# Patient Record
Sex: MALE | Race: WHITE | NOT HISPANIC OR LATINO | Employment: OTHER | URBAN - METROPOLITAN AREA
[De-identification: names, ages, dates, MRNs, and addresses within clinical notes are randomized per-mention and may not be internally consistent; named-entity substitution may affect disease eponyms.]

---

## 2017-10-26 ENCOUNTER — OFFICE VISIT (OUTPATIENT)
Dept: OPTOMETRY | Facility: CLINIC | Age: 82
End: 2017-10-26
Payer: MEDICARE

## 2017-10-26 DIAGNOSIS — H31.012 MACULAR SCAR OF LEFT EYE: ICD-10-CM

## 2017-10-26 DIAGNOSIS — H43.393 VITREOUS FLOATERS OF BOTH EYES: ICD-10-CM

## 2017-10-26 DIAGNOSIS — H52.201 ASTIGMATISM OF RIGHT EYE, UNSPECIFIED TYPE: ICD-10-CM

## 2017-10-26 DIAGNOSIS — H43.393 VITREOUS SYNERESIS OF BOTH EYES: ICD-10-CM

## 2017-10-26 DIAGNOSIS — H25.13 SENILE NUCLEAR SCLEROSIS, BILATERAL: Primary | ICD-10-CM

## 2017-10-26 DIAGNOSIS — H54.8 LEGAL BLINDNESS: ICD-10-CM

## 2017-10-26 DIAGNOSIS — H52.4 PRESBYOPIA: ICD-10-CM

## 2017-10-26 DIAGNOSIS — H35.9 RETINA DISORDER, BILATERAL: ICD-10-CM

## 2017-10-26 PROCEDURE — 92015 DETERMINE REFRACTIVE STATE: CPT | Mod: S$GLB,,, | Performed by: OPTOMETRIST

## 2017-10-26 PROCEDURE — 99999 PR PBB SHADOW E&M-EST. PATIENT-LVL III: CPT | Mod: PBBFAC,,, | Performed by: OPTOMETRIST

## 2017-10-26 PROCEDURE — 92014 COMPRE OPH EXAM EST PT 1/>: CPT | Mod: S$GLB,,, | Performed by: OPTOMETRIST

## 2017-10-26 NOTE — PROGRESS NOTES
"HPI     Concerns About Ocular Health    Additional comments: Eye exam- general eye examination and refraction.  No acute ocular/vision problems.             Comments   Patient's age: 84 y.o. WM  Occupation: Retired   Approximate date of last eye examination:  10/21/2016  Name of last eye doctor seen: Dr. Cohen    Wears glasses? Yes     If yes, wears  Full-time or part-time?  Part time   Present glasses are: Bifocal, SV Distance, SV Reading?  Bifocal  Approximate age of present glasses: 2 years   Got new glasses following last exam, or subsequently?:  No    Any problem with VA with glasses?  No   Wears CLs?:  No   Headaches? Sinus Headaches   Eye pain/discomfort?  No                                                                                      Flashes?  No   Floaters?  No   Diplopia/Double vision?  No   Patient's Ocular History:         Any eye surgeries? No          Any eye injury?  No          Any treatment for eye disease?  No   Family history of eye disease?  No   Significant patient medical history:         1. Diabetes?  No        If yes, IDDM or NIDDM? n/a   2. HBP?  No                 ! OTC eyedrops currently using:  No    ! Prescription eye meds currently using:  No    ! Any history of allergy/adverse reaction to any eye meds used   previously?  No     ! Any history of allergy/adverse reaction to eyedrops used during prior   eye exam(s)? no    ! Any history of allergy/adverse reaction to Novacaine or similar meds?   No    ! Any history of allergy/adverse reaction to Epinephrine or similar meds?   No     ! Patient okay with use of anesthetic eyedrops to check eye pressure?    Yes        ! Patient okay with use of eyedrops to dilate pupils today?  Yes   !  Allergies/Medications/Medical History/Family History reviewed today?    Yes      PD =   65/61  Desired reading distance =  18"                                                                  Last edited by Dustin Cohen, OD on 10/26/2017  " 1:32 PM. (History)            Assessment /Plan     For exam results, see Encounter Report.    1. Senile nuclear sclerosis, bilateral     2. Macular scar of left eye     3. Retina disorder, bilateral     4. Legal blindness - Left Eye     5. Vitreous syneresis of both eyes     6. Vitreous floaters of both eyes     7. Astigmatism of right eye, unspecified type     8. 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   Long-standing history of poor vision in the left eye secondarily.  Bilateral nuclear sclerotic/peripheral cortical cataract, and small axial posterior subcapsular cataract in the left eye. No compelling need for cataract surgery in either eye, as correctable VA in the right eye is very satisfactory, and poor potential for improvement in VA in the the left eye.  Vitreous syneresis with vitreous floaters bilaterally.  Hyperopia with astigmatism in the right eye. Presbyopia.  New spectacle lens Rx issued for use as desired. Recommend full-time wear.   Recheck in twelve months, or prior if any problems or ANY apparent changes in vision in the right eye in the interim.

## 2017-10-26 NOTE — PATIENT INSTRUCTIONS
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   Long-standing history of poor vision in the left eye secondarily.  Bilateral nuclear sclerotic/peripheral cortical cataract, and small axial posterior subcapsular cataract in the left eye. No compelling need for cataract surgery in either eye, as correctable VA in the right eye is very satisfactory, and poor potential for improvement in VA in the the left eye.  Vitreous syneresis with vitreous floaters bilaterally.  Hyperopia with astigmatism in the right eye. Presbyopia.  New spectacle lens Rx issued for use as desired. Recommend full-time wear.   Recheck in twelve months, or prior if any problems or ANY apparent changes in vision in the right eye in the interim.

## 2018-11-07 ENCOUNTER — OFFICE VISIT (OUTPATIENT)
Dept: OPTOMETRY | Facility: CLINIC | Age: 83
End: 2018-11-07
Payer: MEDICARE

## 2018-11-07 DIAGNOSIS — H54.8 LEGAL BLINDNESS: ICD-10-CM

## 2018-11-07 DIAGNOSIS — H52.4 PRESBYOPIA: ICD-10-CM

## 2018-11-07 DIAGNOSIS — H35.9 RETINA DISORDER, BILATERAL: ICD-10-CM

## 2018-11-07 DIAGNOSIS — H31.012 MACULAR SCAR OF LEFT EYE: ICD-10-CM

## 2018-11-07 DIAGNOSIS — H43.393 VITREOUS FLOATERS OF BOTH EYES: ICD-10-CM

## 2018-11-07 DIAGNOSIS — H52.201 ASTIGMATISM OF RIGHT EYE, UNSPECIFIED TYPE: ICD-10-CM

## 2018-11-07 DIAGNOSIS — H25.13 SENILE NUCLEAR SCLEROSIS, BILATERAL: Primary | ICD-10-CM

## 2018-11-07 PROCEDURE — 99999 PR PBB SHADOW E&M-EST. PATIENT-LVL III: CPT | Mod: PBBFAC,,, | Performed by: OPTOMETRIST

## 2018-11-07 PROCEDURE — 92015 DETERMINE REFRACTIVE STATE: CPT | Mod: S$GLB,,, | Performed by: OPTOMETRIST

## 2018-11-07 PROCEDURE — 92014 COMPRE OPH EXAM EST PT 1/>: CPT | Mod: S$GLB,,, | Performed by: OPTOMETRIST

## 2018-11-07 RX ORDER — SOTALOL HYDROCHLORIDE 80 MG/1
TABLET ORAL
COMMUNITY
Start: 2018-10-31

## 2018-11-07 RX ORDER — APIXABAN 5 MG/1
TABLET, FILM COATED ORAL
COMMUNITY
Start: 2018-09-06

## 2018-11-07 RX ORDER — CETIRIZINE HYDROCHLORIDE 10 MG/1
10 TABLET ORAL
COMMUNITY
Start: 2018-08-21

## 2018-11-07 NOTE — PATIENT INSTRUCTIONS
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   Long-standing history of poor vision in the left eye secondarily.    Bilateral nuclear sclerotic/peripheral cortical cataract, and small axial posterior subcapsular cataract in the left eye. No compelling need for cataract surgery in either eye, as correctable VA in the right eye is still very satisfactory, and poor potential for improvement in VA in the the left eye.    Vitreous syneresis with vitreous floaters bilaterally.    Hyperopia with astigmatism in the right eye. Presbyopia.  New spectacle lens Rx issued for use as desired. Recommend full-time wear.     Recheck in twelve months, or prior if any problems or ANY apparent changes in vision in the right eye in the interim.

## 2018-11-07 NOTE — PROGRESS NOTES
"HPI     Concerns About Ocular Health      Additional comments: Annual eye examination and refraction.  History of poor vision in the left eye 2/2 macular scarring.  No acute ocular/vision problems.  No apparent VA changes since last visit.               Comments     Patient's age: 85 y.o. WM  Occupation: Retired   Approximate date of last eye examination:  10/26/2017   Name of last eye doctor seen: Dr. Cohen  Wears glasses? Yes    If yes, wears  Full-time or part-time? full time   Present glasses are: Bifocal, SV Distance, SV Reading?  Bifocal  Approximate age of present glasses: 2 years   Got new glasses following last exam, or subsequently?:  No    Any problem with VA with glasses?  No   Wears CLs?:  No   Headaches? Sinus Headaches   Eye pain/discomfort?  No                                                                                      Flashes?  No   Floaters?  No   Diplopia/Double vision?  No   Patient's Ocular History:         Any eye surgeries? No          Any eye injury?  No          Any treatment for eye disease?  No   Family history of eye disease?  No   Significant patient medical history:         1. Diabetes?  No        If yes, IDDM or NIDDM? n/a   2. HBP?  No                 ! OTC eyedrops currently using:  No    ! Prescription eye meds currently using:  No    ! Any history of allergy/adverse reaction to any eye meds used   previously?  No     ! Any history of allergy/adverse reaction to eyedrops used during prior   eye exam(s)? no    ! Any history of allergy/adverse reaction to Novacaine or similar meds?   No    ! Any history of allergy/adverse reaction to Epinephrine or similar meds?   No     ! Patient okay with use of anesthetic eyedrops to check eye pressure?    Yes        ! Patient okay with use of eyedrops to dilate pupils today?  Yes   !  Allergies/Medications/Medical History/Family History reviewed today?    Yes      PD =   65/61  Desired reading distance =  18"                           "                                           Last edited by Dustin Cohen, OD on 11/7/2018  1:32 PM. (History)            Assessment /Plan     For exam results, see Encounter Report.    1. Senile nuclear sclerosis, bilateral     2. Macular scar of left eye     3. Retina disorder, bilateral     4. Legal blindness - Left Eye     5. Vitreous floaters of both eyes     6. Astigmatism of right eye, unspecified type     7. 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   Long-standing history of poor vision in the left eye secondarily.    Bilateral nuclear sclerotic/peripheral cortical cataract, and small axial posterior subcapsular cataract in the left eye. No compelling need for cataract surgery in either eye, as correctable VA in the right eye is still very satisfactory, and poor potential for improvement in VA in the the left eye.    Vitreous syneresis with vitreous floaters bilaterally.    Hyperopia with astigmatism in the right eye. Presbyopia.  New spectacle lens Rx issued for use as desired. Recommend full-time wear.     Recheck in twelve months, or prior if any problems or ANY apparent changes in vision in the right eye in the interim.

## 2019-11-12 ENCOUNTER — OFFICE VISIT (OUTPATIENT)
Dept: OPTOMETRY | Facility: CLINIC | Age: 84
End: 2019-11-12
Payer: MEDICARE

## 2019-11-12 DIAGNOSIS — H25.13 SENILE NUCLEAR SCLEROSIS, BILATERAL: Primary | ICD-10-CM

## 2019-11-12 DIAGNOSIS — H43.393 VITREOUS FLOATERS OF BOTH EYES: ICD-10-CM

## 2019-11-12 DIAGNOSIS — H35.9 RETINA DISORDER, BILATERAL: ICD-10-CM

## 2019-11-12 DIAGNOSIS — H52.201 ASTIGMATISM OF RIGHT EYE, UNSPECIFIED TYPE: ICD-10-CM

## 2019-11-12 DIAGNOSIS — H52.4 PRESBYOPIA: ICD-10-CM

## 2019-11-12 DIAGNOSIS — H54.8 LEGAL BLINDNESS: ICD-10-CM

## 2019-11-12 DIAGNOSIS — H31.012 MACULAR SCAR OF LEFT EYE: ICD-10-CM

## 2019-11-12 DIAGNOSIS — H26.9 CORTICAL CATARACT OF BOTH EYES: ICD-10-CM

## 2019-11-12 PROCEDURE — 92015 PR REFRACTION: ICD-10-PCS | Mod: S$GLB,,, | Performed by: OPTOMETRIST

## 2019-11-12 PROCEDURE — 99999 PR PBB SHADOW E&M-EST. PATIENT-LVL III: ICD-10-PCS | Mod: PBBFAC,,, | Performed by: OPTOMETRIST

## 2019-11-12 PROCEDURE — 92014 PR EYE EXAM, EST PATIENT,COMPREHESV: ICD-10-PCS | Mod: S$GLB,,, | Performed by: OPTOMETRIST

## 2019-11-12 PROCEDURE — 99999 PR PBB SHADOW E&M-EST. PATIENT-LVL III: CPT | Mod: PBBFAC,,, | Performed by: OPTOMETRIST

## 2019-11-12 PROCEDURE — 92015 DETERMINE REFRACTIVE STATE: CPT | Mod: S$GLB,,, | Performed by: OPTOMETRIST

## 2019-11-12 PROCEDURE — 92014 COMPRE OPH EXAM EST PT 1/>: CPT | Mod: S$GLB,,, | Performed by: OPTOMETRIST

## 2019-11-12 RX ORDER — ALBUTEROL SULFATE 90 UG/1
2 AEROSOL, METERED RESPIRATORY (INHALATION)
COMMUNITY
Start: 2019-05-10

## 2019-11-12 RX ORDER — MECLIZINE HYDROCHLORIDE 25 MG/1
25 TABLET ORAL
COMMUNITY
Start: 2018-12-07

## 2019-11-12 NOTE — PROGRESS NOTES
"HPI     eye examination       Additional comments: No changes with vision              Comments     Patient's age: 86 y.o. WM  Occupation: Retired   Approximate date of last eye examination:  11/07/2018  Name of last eye doctor seen: Dr. Cohen  Wears glasses? Yes    If yes, wears  Full-time or part-time? full time   Present glasses are: Bifocal, SV Distance, SV Reading?  Bifocal lens   Approximate age of present glasses: 2 + years   Got new glasses following last exam, or subsequently?:  No    Any problem with VA with glasses?  No reports no changes with vision.  Wears CLs?:  No   Headaches? No   Eye pain/discomfort?  No                                                                                      Flashes?  No   Floaters?  No   Diplopia/Double vision?  No   Patient's Ocular History:         Any eye surgeries? No          Any eye injury?  No          Any treatment for eye disease?  No   Family history of eye disease?  No   Significant patient medical history:         1. Diabetes?  No        If yes, IDDM or NIDDM? n/a   2. HBP?  No              ! OTC eyedrops currently using:  No    ! Prescription eye meds currently using:  No    ! Any history of allergy/adverse reaction to any eye meds used   previously?  No     ! Any history of allergy/adverse reaction to eyedrops used during prior   eye exam(s)? no    ! Any history of allergy/adverse reaction to Novacaine or similar meds?   No    ! Any history of allergy/adverse reaction to Epinephrine or similar meds?   No     ! Patient okay with use of anesthetic eyedrops to check eye pressure?    Yes        ! Patient okay with use of eyedrops to dilate pupils today?  Yes   !  Allergies/Medications/Medical History/Family History reviewed today?    Yes      PD =   65/61  Desired reading distance =  18"                                                                     Last edited by Dustin Cohen, OD on 11/12/2019 11:31 AM. (History)            Assessment /Plan "     For exam results, see Encounter Report.    1. Senile nuclear sclerosis, bilateral     2. Cortical cataract of both eyes     3. Macular scar of left eye     4. Retina disorder, bilateral     5. Legal blindness - Left Eye     6. Vitreous floaters of both eyes     7. Astigmatism of right eye, unspecified type     8. 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   Long-standing history of poor vision in the left eye secondarily.     Bilateral nuclear sclerotic/peripheral cortical cataract, and small axial posterior subcapsular cataract in the left eye. No compelling need for cataract surgery in either eye, as correctable VA in the right eye is still very satisfactory, and poor potential for improvement in VA in the the left eye.     Vitreous syneresis with vitreous floaters bilaterally.     Hyperopia with astigmatism in the right eye. Presbyopia.  New spectacle lens Rx issued for use as desired. Recommend full-time wear.      Recheck in twelve months, or prior if any problems or ANY apparent changes in vision in the right eye in the interim.

## 2020-12-08 ENCOUNTER — OFFICE VISIT (OUTPATIENT)
Dept: OPTOMETRY | Facility: CLINIC | Age: 85
End: 2020-12-08
Payer: MEDICARE

## 2020-12-08 DIAGNOSIS — H31.012 MACULAR SCAR OF LEFT EYE: ICD-10-CM

## 2020-12-08 DIAGNOSIS — H54.8 LEGAL BLINDNESS: ICD-10-CM

## 2020-12-08 DIAGNOSIS — H52.201 ASTIGMATISM OF RIGHT EYE, UNSPECIFIED TYPE: ICD-10-CM

## 2020-12-08 DIAGNOSIS — H52.4 PRESBYOPIA: ICD-10-CM

## 2020-12-08 DIAGNOSIS — H25.13 SENILE NUCLEAR SCLEROSIS, BILATERAL: Primary | ICD-10-CM

## 2020-12-08 DIAGNOSIS — H26.9 CORTICAL CATARACT OF BOTH EYES: ICD-10-CM

## 2020-12-08 DIAGNOSIS — H35.9 RETINA DISORDER, BILATERAL: ICD-10-CM

## 2020-12-08 PROCEDURE — 1101F PT FALLS ASSESS-DOCD LE1/YR: CPT | Mod: S$GLB,,, | Performed by: OPTOMETRIST

## 2020-12-08 PROCEDURE — 99999 PR PBB SHADOW E&M-EST. PATIENT-LVL III: ICD-10-PCS | Mod: PBBFAC,,, | Performed by: OPTOMETRIST

## 2020-12-08 PROCEDURE — 1126F AMNT PAIN NOTED NONE PRSNT: CPT | Mod: S$GLB,,, | Performed by: OPTOMETRIST

## 2020-12-08 PROCEDURE — 3288F FALL RISK ASSESSMENT DOCD: CPT | Mod: S$GLB,,, | Performed by: OPTOMETRIST

## 2020-12-08 PROCEDURE — 1126F PR PAIN SEVERITY QUANTIFIED, NO PAIN PRESENT: ICD-10-PCS | Mod: S$GLB,,, | Performed by: OPTOMETRIST

## 2020-12-08 PROCEDURE — 1101F PR PT FALLS ASSESS DOC 0-1 FALLS W/OUT INJ PAST YR: ICD-10-PCS | Mod: S$GLB,,, | Performed by: OPTOMETRIST

## 2020-12-08 PROCEDURE — 92015 PR REFRACTION: ICD-10-PCS | Mod: S$GLB,,, | Performed by: OPTOMETRIST

## 2020-12-08 PROCEDURE — 99999 PR PBB SHADOW E&M-EST. PATIENT-LVL III: CPT | Mod: PBBFAC,,, | Performed by: OPTOMETRIST

## 2020-12-08 PROCEDURE — 92014 PR EYE EXAM, EST PATIENT,COMPREHESV: ICD-10-PCS | Mod: S$GLB,,, | Performed by: OPTOMETRIST

## 2020-12-08 PROCEDURE — 3288F PR FALLS RISK ASSESSMENT DOCUMENTED: ICD-10-PCS | Mod: S$GLB,,, | Performed by: OPTOMETRIST

## 2020-12-08 PROCEDURE — 92014 COMPRE OPH EXAM EST PT 1/>: CPT | Mod: S$GLB,,, | Performed by: OPTOMETRIST

## 2020-12-08 PROCEDURE — 92015 DETERMINE REFRACTIVE STATE: CPT | Mod: S$GLB,,, | Performed by: OPTOMETRIST

## 2020-12-08 NOTE — PROGRESS NOTES
"HPI     Annual Exam      Additional comments: Annual general eye examination and refraction.  No acute ocular/vision problems   No problems.  Recent fall - struck near left eye,   Black eye secondarily.               Comments     Patient's age: 87 y.o. WM  Occupation: Retired   Approximate date of last eye examination:  11/12/2019  Name of last eye doctor seen: Dr. Cohen  Wears glasses? Yes    If yes, wears  Full-time or part-time? full time   Present glasses are: Bifocal, SV Distance, SV Reading?  Bifocal lens   Approximate age of present glasses: 1 + year   Got new glasses following last exam, or subsequently?:  Yes    Any problem with VA with glasses?  No visual changes   Wears CLs?:  No   Headaches? No   Eye pain/discomfort?  No                                                                                      Flashes?  No   Floaters?  No   Diplopia/Double vision?  No   Patient's Ocular History:         Any eye surgeries? No          Any eye injury?  No          Any treatment for eye disease?  No   Family history of eye disease?  No   Significant patient medical history:         1. Diabetes?  No        If yes, IDDM or NIDDM? n/a   2. HBP?  No              ! OTC eyedrops currently using:  No    ! Prescription eye meds currently using:  No    ! Any history of allergy/adverse reaction to any eye meds used   previously?  No     ! Any history of allergy/adverse reaction to eyedrops used during prior   eye exam(s)? no    ! Any history of allergy/adverse reaction to Novacaine or similar meds?   No    ! Any history of allergy/adverse reaction to Epinephrine or similar meds?   No     ! Patient okay with use of anesthetic eyedrops to check eye pressure?    Yes        ! Patient okay with use of eyedrops to dilate pupils today?  Yes   !  Allergies/Medications/Medical History/Family History reviewed today?    Yes      PD =   65/61  Desired reading distance = 18"                                                       "               Last edited by Dustin Cohen, OD on 12/8/2020 11:34 AM. (History)            Assessment /Plan     For exam results, see Encounter Report.    1. Senile nuclear sclerosis, bilateral     2. Cortical cataract of both eyes     3. Macular scar of left eye     4. Retina disorder, bilateral     5. Legal blindness - Left Eye     6. Astigmatism of right eye, unspecified type     7. 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   Long-standing history of poor vision in the left eye secondarily.     Bilateral nuclear sclerotic/peripheral cortical cataract, and small axial posterior subcapsular cataract in the left eye. No compelling need for cataract surgery in either eye, as correctable VA in the right eye is still very satisfactory, and poor potential for improvement in VA in the the left eye.     Vitreous syneresis with vitreous floaters bilaterally.     Hyperopia with astigmatism in the right eye. Presbyopia.  New spectacle lens Rx issued for use as desired. Recommend full-time wear.      Recheck in twelve months, or prior if any problems or ANY apparent changes in vision in the right eye in the interim.

## 2021-12-28 ENCOUNTER — TELEPHONE (OUTPATIENT)
Dept: OPHTHALMOLOGY | Facility: CLINIC | Age: 86
End: 2021-12-28
Payer: MEDICARE

## 2022-07-26 ENCOUNTER — OFFICE VISIT (OUTPATIENT)
Dept: OPTOMETRY | Facility: CLINIC | Age: 87
End: 2022-07-26
Payer: MEDICARE

## 2022-07-26 DIAGNOSIS — H52.201 ASTIGMATISM OF RIGHT EYE, UNSPECIFIED TYPE: ICD-10-CM

## 2022-07-26 DIAGNOSIS — H52.4 PRESBYOPIA: ICD-10-CM

## 2022-07-26 DIAGNOSIS — H35.9 RETINA DISORDER, BILATERAL: ICD-10-CM

## 2022-07-26 DIAGNOSIS — H26.9 CORTICAL CATARACT OF BOTH EYES: ICD-10-CM

## 2022-07-26 DIAGNOSIS — H54.8 LEGAL BLINDNESS: ICD-10-CM

## 2022-07-26 DIAGNOSIS — H31.012 MACULAR SCAR OF LEFT EYE: ICD-10-CM

## 2022-07-26 DIAGNOSIS — H25.13 SENILE NUCLEAR SCLEROSIS, BILATERAL: Primary | ICD-10-CM

## 2022-07-26 PROCEDURE — 3288F FALL RISK ASSESSMENT DOCD: CPT | Mod: CPTII,S$GLB,, | Performed by: OPTOMETRIST

## 2022-07-26 PROCEDURE — 3288F PR FALLS RISK ASSESSMENT DOCUMENTED: ICD-10-PCS | Mod: CPTII,S$GLB,, | Performed by: OPTOMETRIST

## 2022-07-26 PROCEDURE — 99999 PR PBB SHADOW E&M-EST. PATIENT-LVL III: CPT | Mod: PBBFAC,,, | Performed by: OPTOMETRIST

## 2022-07-26 PROCEDURE — 99999 PR PBB SHADOW E&M-EST. PATIENT-LVL III: ICD-10-PCS | Mod: PBBFAC,,, | Performed by: OPTOMETRIST

## 2022-07-26 PROCEDURE — 92015 PR REFRACTION: ICD-10-PCS | Mod: S$GLB,,, | Performed by: OPTOMETRIST

## 2022-07-26 PROCEDURE — 1101F PR PT FALLS ASSESS DOC 0-1 FALLS W/OUT INJ PAST YR: ICD-10-PCS | Mod: CPTII,S$GLB,, | Performed by: OPTOMETRIST

## 2022-07-26 PROCEDURE — 1126F AMNT PAIN NOTED NONE PRSNT: CPT | Mod: CPTII,S$GLB,, | Performed by: OPTOMETRIST

## 2022-07-26 PROCEDURE — 1126F PR PAIN SEVERITY QUANTIFIED, NO PAIN PRESENT: ICD-10-PCS | Mod: CPTII,S$GLB,, | Performed by: OPTOMETRIST

## 2022-07-26 PROCEDURE — 1101F PT FALLS ASSESS-DOCD LE1/YR: CPT | Mod: CPTII,S$GLB,, | Performed by: OPTOMETRIST

## 2022-07-26 PROCEDURE — 92014 PR EYE EXAM, EST PATIENT,COMPREHESV: ICD-10-PCS | Mod: S$GLB,,, | Performed by: OPTOMETRIST

## 2022-07-26 PROCEDURE — 92014 COMPRE OPH EXAM EST PT 1/>: CPT | Mod: S$GLB,,, | Performed by: OPTOMETRIST

## 2022-07-26 PROCEDURE — 1159F MED LIST DOCD IN RCRD: CPT | Mod: CPTII,S$GLB,, | Performed by: OPTOMETRIST

## 2022-07-26 PROCEDURE — 92015 DETERMINE REFRACTIVE STATE: CPT | Mod: S$GLB,,, | Performed by: OPTOMETRIST

## 2022-07-26 PROCEDURE — 1159F PR MEDICATION LIST DOCUMENTED IN MEDICAL RECORD: ICD-10-PCS | Mod: CPTII,S$GLB,, | Performed by: OPTOMETRIST

## 2022-07-26 RX ORDER — GUAIFENESIN 600 MG/1
600 TABLET, EXTENDED RELEASE ORAL
COMMUNITY
Start: 2021-12-20

## 2022-07-26 NOTE — PROGRESS NOTES
"HPI     annual general eye examination and refraction       Additional comments: Annual general eye examination and refraction.  Low vision in the left eye, secondary to peripapillary chorioretinitis   scarring.   No acute ocular/vision problems.   Wears glasses full-time               Comments     Patient's age: 89 y.o. WM  Approximate date of last eye examination:  12/08/2020  Name of last eye doctor seen: Dr. Cohen  Wears glasses? Yes    If yes, wears  Full-time or part-time? full time   Present glasses are: Bifocal, SV Distance, SV Reading?  Bifocal lens   Approximate age of present glasses:2 year   Got new glasses following last exam, or subsequently?:  Yes    Any problem with VA with glasses?  No visual changes   Wears CLs?:  No   Headaches? No   Eye pain/discomfort?  No                                                                                      Flashes?  No   Floaters?  Yes    Diplopia/Double vision?  No   Patient's Ocular History:         Any eye surgeries? No          Any eye injury?  No          Any treatment for eye disease?  No   Family history of eye disease?  No   Significant patient medical history:         1. Diabetes?  No        If yes, IDDM or NIDDM? n/a   2. HBP?  No              ! OTC eyedrops currently using:  No    ! Prescription eye meds currently using:  No    ! Any history of allergy/adverse reaction to any eye meds used   previously?  No     ! Any history of allergy/adverse reaction to eyedrops used during prior   eye exam(s)? no    ! Any history of allergy/adverse reaction to Novacaine or similar meds?   No    ! Any history of allergy/adverse reaction to Epinephrine or similar meds?   No     ! Patient okay with use of anesthetic eyedrops to check eye pressure?    Yes        ! Patient okay with use of eyedrops to dilate pupils today?  Yes   !  Allergies/Medications/Medical History/Family History reviewed today?    Yes      PD =   65/61  Desired reading distance = 18"            "                                                          Last edited by Dustin Cohen, OD on 7/26/2022  9:50 AM. (History)            Assessment /Plan     For exam results, see Encounter Report.    1. Senile nuclear sclerosis, bilateral     2. Cortical cataract of both eyes     3. Macular scar of left eye     4. Retina disorder, bilateral     5. Legal blindness - Left Eye     6. Astigmatism of right eye, unspecified type     7. 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   Long-standing history of poor vision in the left eye secondarily.     Bilateral nuclear sclerotic/peripheral cortical cataract, and small axial posterior subcapsular cataract in the left eye. No compelling need for cataract surgery in either eye, as correctable VA in the right eye is still very satisfactory, and poor potential for improvement in VA in the the left eye.     Vitreous syneresis with vitreous floaters bilaterally.     Simple hyperopic astigmatism in the right eye. Presbyopia.  New spectacle lens Rx issued for use as desired. Recommend full-time wear.      Recheck in twelve months, or prior if any problems or ANY apparent changes in vision in the right eye in the interim.

## 2022-07-26 NOTE — PATIENT INSTRUCTIONS
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   Long-standing history of poor vision in the left eye secondarily.     Bilateral nuclear sclerotic/peripheral cortical cataract, and small axial posterior subcapsular cataract in the left eye. No compelling need for cataract surgery in either eye, as correctable VA in the right eye is still very satisfactory, and poor potential for improvement in VA in the the left eye.     Vitreous syneresis with vitreous floaters bilaterally.     Simple hyperopic astigmatism in the right eye. Presbyopia.  New spectacle lens Rx issued for use as desired. Recommend full-time wear.      Recheck in twelve months, or prior if any problems or ANY apparent changes in vision in the right eye in the interim.

## 2022-11-16 ENCOUNTER — TELEPHONE (OUTPATIENT)
Dept: OPHTHALMOLOGY | Facility: CLINIC | Age: 87
End: 2022-11-16
Payer: MEDICARE

## 2022-11-16 NOTE — TELEPHONE ENCOUNTER
----- Message from Felipe Anne sent at 11/16/2022  2:48 PM CST -----  Contact: Ayaan [phone]  Pt is calling because he wants to know if he switches to [employer] for his insurance if  is still covered for him. Please call back to further assist.

## 2022-11-16 NOTE — TELEPHONE ENCOUNTER
Spoke to pt and advised for pt to call the insurance company to see if Dr Cohen will be on the plan he switches to.  Pt understood.

## 2023-07-31 ENCOUNTER — OFFICE VISIT (OUTPATIENT)
Dept: OPTOMETRY | Facility: CLINIC | Age: 88
End: 2023-07-31
Payer: COMMERCIAL

## 2023-07-31 DIAGNOSIS — H26.9 CORTICAL CATARACT OF BOTH EYES: ICD-10-CM

## 2023-07-31 DIAGNOSIS — H25.13 SENILE NUCLEAR SCLEROSIS, BILATERAL: Primary | ICD-10-CM

## 2023-07-31 DIAGNOSIS — H52.201 ASTIGMATISM OF RIGHT EYE, UNSPECIFIED TYPE: ICD-10-CM

## 2023-07-31 DIAGNOSIS — H52.4 PRESBYOPIA: ICD-10-CM

## 2023-07-31 DIAGNOSIS — H54.8 LEGAL BLINDNESS: ICD-10-CM

## 2023-07-31 DIAGNOSIS — H31.012 MACULAR SCAR OF LEFT EYE: ICD-10-CM

## 2023-07-31 DIAGNOSIS — H54.52A1 LOW VISION OF LEFT EYE: ICD-10-CM

## 2023-07-31 PROCEDURE — 92014 PR EYE EXAM, EST PATIENT,COMPREHESV: ICD-10-PCS | Mod: S$GLB,,, | Performed by: OPTOMETRIST

## 2023-07-31 PROCEDURE — 99999 PR PBB SHADOW E&M-EST. PATIENT-LVL III: ICD-10-PCS | Mod: PBBFAC,,, | Performed by: OPTOMETRIST

## 2023-07-31 PROCEDURE — 92015 DETERMINE REFRACTIVE STATE: CPT | Mod: S$GLB,,, | Performed by: OPTOMETRIST

## 2023-07-31 PROCEDURE — 92015 PR REFRACTION: ICD-10-PCS | Mod: S$GLB,,, | Performed by: OPTOMETRIST

## 2023-07-31 PROCEDURE — 92014 COMPRE OPH EXAM EST PT 1/>: CPT | Mod: S$GLB,,, | Performed by: OPTOMETRIST

## 2023-07-31 PROCEDURE — 99999 PR PBB SHADOW E&M-EST. PATIENT-LVL III: CPT | Mod: PBBFAC,,, | Performed by: OPTOMETRIST

## 2023-07-31 NOTE — PATIENT INSTRUCTIONS
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   Long-standing history of poor vision in the left eye secondarily.     Bilateral nuclear sclerotic/peripheral cortical cataract, and small axial posterior subcapsular cataract in the left eye. No compelling need for cataract surgery in either eye, as correctable VA in the right eye is still very satisfactory, and poor potential for improvement in VA in the the left eye.     Vitreous syneresis with vitreous floaters bilaterally.    Mild epiretinal membrane at the posterior pole of the right eye.  No significant impact on visual acuity.     Compound hyperopic astigmatism in the right eye. Presbyopia.  New spectacle lens Rx issued for use as desired. Recommend full-time wear.      Recheck in twelve months, or prior if any problems or ANY apparent changes in vision in the right eye in the interim.

## 2023-07-31 NOTE — PROGRESS NOTES
"HPI     eye exam            Comments: General eye examination and refraction.  No acute ocular/vision problems.   History of poor vision in left eye secondary to macular scar.           Comments    Patient's age: 89 y.o. WM  Approximate date of last eye examination:  07/26/2022  Name of last eye doctor seen: Dr. Cohen  Wears glasses? Yes    If yes, wears  Full-time or part-time? Part-time   Present glasses are: Bifocal, SV Distance, SV Reading?  Bifocal lens   Approximate age of present glasses:3 yrs old  Got new glasses following last exam, or subsequently?:  No   Any problem with VA with glasses?  No visual changes   Wears CLs?:  No   Headaches? No   Eye pain/discomfort?  No                                                                                      Flashes?  No   Floaters? No  Diplopia/Double vision?  No   Patient's Ocular History:         Any eye surgeries? No          Any eye injury?  No          Any treatment for eye disease?  No   Family history of eye disease?  No   Significant patient medical history:         1. Diabetes?  No        If yes, IDDM or NIDDM? n/a   2. HBP?  No              ! OTC eyedrops currently using:  No    ! Prescription eye meds currently using:  No    ! Any history of allergy/adverse reaction to any eye meds used   previously?  No     ! Any history of allergy/adverse reaction to eyedrops used during prior   eye exam(s)? no    ! Any history of allergy/adverse reaction to Novacaine or similar meds?   No    ! Any history of allergy/adverse reaction to Epinephrine or similar meds?   No     ! Patient okay with use of anesthetic eyedrops to check eye pressure?    Yes        ! Patient okay with use of eyedrops to dilate pupils today?  Yes   !  Allergies/Medications/Medical History/Family History reviewed today?    Yes      PD =   65/61  Desired reading distance =  18"                                                                     Last edited by Dustin Cohen, OD on " 7/31/2023 10:44 AM.            Assessment /Plan     For exam results, see Encounter Report.    1. Senile nuclear sclerosis, bilateral        2. Cortical cataract of both eyes        3. Macular scar of left eye        4. Low vision of left eye        5. Legal blindness - Left Eye        6. Astigmatism of right eye, unspecified type        7. 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   Long-standing history of poor vision in the left eye secondarily.     Bilateral nuclear sclerotic/peripheral cortical cataract, and small axial posterior subcapsular cataract in the left eye. No compelling need for cataract surgery in either eye, as correctable VA in the right eye is still very satisfactory, and poor potential for improvement in VA in the the left eye.     Vitreous syneresis with vitreous floaters bilaterally.    Mild epiretinal membrane at the posterior pole of the right eye.  No significant impact on visual acuity.     Compound hyperopic astigmatism in the right eye. Presbyopia.  New spectacle lens Rx issued for use as desired. Recommend full-time wear.      Recheck in twelve months, or prior if any problems or ANY apparent changes in vision in the right eye in the interim.